# Patient Record
Sex: MALE | Race: WHITE | NOT HISPANIC OR LATINO | Employment: FULL TIME | URBAN - NONMETROPOLITAN AREA
[De-identification: names, ages, dates, MRNs, and addresses within clinical notes are randomized per-mention and may not be internally consistent; named-entity substitution may affect disease eponyms.]

---

## 2023-10-03 NOTE — PROGRESS NOTES
Lawrence Ramos is a 48 y.o. male who presents for No chief complaint on file.    Review of systems completed and unremarkable other than what is documented in HPI.     Social history:  Medical history:  Medications:  SurgHx:  Fhx:  Allergies:    Objective   There were no vitals taken for this visit.    Gen: No acute distress, alert and oriented x3, pleasant   HEENT: moist mucous membranes, b/l external auditory canals are clear of debris, TMs within normal limits, no oropharyngeal lesions, eomi, perrla   Neck: thyroid within normal limits, no lymphadenopathy   CV: RRR, normal S1/S2, no murmur   Resp: Clear to auscultation bilaterally, no wheezes or rhonchi appreciated  Abd: soft, nontender, non-distended, no guarding/rigidity, bowel sounds present  Extr: no edema, no calf tenderness  Derm: Skin is warm and dry, no rashes appreciated  Psych: mood is good, affect is congruent, good hygiene, normal speech and eye contact  Neuro: cranial nerves grossly intact, normal gait    Assessment/Plan

## 2023-10-10 ENCOUNTER — APPOINTMENT (OUTPATIENT)
Dept: PRIMARY CARE | Facility: CLINIC | Age: 48
End: 2023-10-10
Payer: COMMERCIAL

## 2023-10-13 ENCOUNTER — OFFICE VISIT (OUTPATIENT)
Dept: PRIMARY CARE | Facility: CLINIC | Age: 48
End: 2023-10-13
Payer: COMMERCIAL

## 2023-10-13 VITALS
HEART RATE: 71 BPM | HEIGHT: 73 IN | DIASTOLIC BLOOD PRESSURE: 85 MMHG | BODY MASS INDEX: 33.93 KG/M2 | WEIGHT: 256 LBS | SYSTOLIC BLOOD PRESSURE: 120 MMHG

## 2023-10-13 DIAGNOSIS — Z12.5 SCREENING FOR PROSTATE CANCER: ICD-10-CM

## 2023-10-13 DIAGNOSIS — Z12.11 SCREENING FOR COLON CANCER: ICD-10-CM

## 2023-10-13 DIAGNOSIS — Z00.00 HEALTHCARE MAINTENANCE: ICD-10-CM

## 2023-10-13 DIAGNOSIS — Z13.220 SCREENING FOR HYPERLIPIDEMIA: Primary | ICD-10-CM

## 2023-10-13 DIAGNOSIS — M54.50 LOW BACK PAIN, UNSPECIFIED BACK PAIN LATERALITY, UNSPECIFIED CHRONICITY, UNSPECIFIED WHETHER SCIATICA PRESENT: ICD-10-CM

## 2023-10-13 PROCEDURE — 99386 PREV VISIT NEW AGE 40-64: CPT | Performed by: FAMILY MEDICINE

## 2023-10-13 NOTE — PROGRESS NOTES
This is a 47yo male here to establish care:    Low back stiffness: He was running half marathons about 6 or 7 years ago. Now dealing with a lot of tightness in low back and legs. He has been to chiropractor with minimal improvement.     His wife is Jacklyn Ramos and his parents are Familia and Berenice Ramos. Here to establish care and checked out, prefers to avoid medications if possible.     Review of systems completed and unremarkable other than what is documented in HPI.    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  Allergies: PCN    Gen: No acute distress. Alert and oriented x3.   HEENT: Normocephalic, atraumatic. PERRLA and EOMI, no conjunctival injection. B/L EAC are clear, TM's viewed are WNL. No rhinorrhea, no oropharyngeal lesions.  Neck: No lymphadenopathy, thyroid WNL.  CV: Regular rate and rhythm. Normal S1/S2.  Resp: CTAB/L. No wheezes or rhonchi appreciated.  Abdomen: Soft. Nontender. Nondistended. Bowel sounds normoactive. No guarding or rigidity.  Derm: Skin is warm and dry. No rashes appreciated or suspicious lesions noted.   Neuro: Cranial nerves intact. Normal gait.  Psych: Appropriate mood and affect. Normal speech and eye contact.   Extremities: No deformities appreciated. No severe edema.     47yo male here to establish care:    #Low back pain  Referring to PT to work on core strengthening and stretching    HCM:  Referral placed for C-scope  Declining flu vaccine  UTD for COVID vaccine  Check labs  Declining PSA this year

## 2023-10-13 NOTE — PATIENT INSTRUCTIONS
PT/OT:  [facility] [phone]     Gastroenterology:  Samy Cornejo ([facility]) [phone]  Samy Barr ([facility]) [phone]

## 2024-10-18 ENCOUNTER — APPOINTMENT (OUTPATIENT)
Dept: PRIMARY CARE | Facility: CLINIC | Age: 49
End: 2024-10-18
Payer: COMMERCIAL

## 2024-11-05 NOTE — PROGRESS NOTES
"Subjective   Patient ID: Lawrence Ramos is a 49 y.o. male who presents for Follow-up (Dx with trigeminal neuralgia; finished low back PT, not having an issue right now, but it depends on the day, better than before).    Trigeminal neuralgia: Started back in April. Unsure what the cause is. He actually got referred to neurology by the dentist. First had dental procedure and they referred him to [facility]. Completed MRI/MRA. They talked about medications and he felt frustration about taking meds. He feels it is actually also TMJ arthritis. He went to a chiropractor in [place] and he agreed with the TMJ, They takjed to him about gabapentin and he decided against that. Sx fluctuate by the day. Almost gone over the summer. He can feel it in his face and lip. Right upper lipand teeth in the back, on the inside of his jaw. Sometimes feels it in the eye. Eye irritation with mowing the lawn and dust exposure. Sx last 30 seconds or several minutes. Things improved when the weather changed into beginning of summer, and that's also when he was seeing the [place] chiropractor. He is using medicinal marijuana with a small amount of THC which would alleviate his symptoms within 30 seconds. Currently taking it before bed.     His wife is Jacklyn Raoms and his parents are Familia and Berenice Ramos. 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      Review of systems completed and unremarkable other than what is documented in HPI.    Objective   /71 (BP Location: Left arm, Patient Position: Sitting, BP Cuff Size: Large adult)   Pulse 71   Ht 1.88 m (6' 2\")   Wt 122 kg (270 lb)   BMI 34.67 kg/m²     Gen: No acute distress, alert and oriented x3, pleasant   HEENT: moist mucous membranes, b/l external auditory canals are clear of debris, TMs within normal limits, no oropharyngeal lesions, eomi, perrla   Neck: thyroid within normal " limits, no lymphadenopathy   CV: RRR, normal S1/S2, no murmur   Resp: Clear to auscultation bilaterally, no wheezes or rhonchi appreciated  Abd: soft, nontender, non-distended, no guarding/rigidity, bowel sounds present  Extr: no edema, no calf tenderness  Derm: Skin is warm and dry, no rashes appreciated  Psych: mood is good, affect is congruent, good hygiene, normal speech and eye contact  Neuro: cranial nerves grossly intact, normal gait    Assessment/Plan   #TMJ  #TN  He prefers to avoid meds  He has seen chiropractor and is planning to see a TMJ specialist  Check labs, TMJ MRI     HCM:  Referral placed for C-scope  Declining flu vaccine  UTD for COVID vaccine  Check labs  Declining PSA this year

## 2024-11-12 ENCOUNTER — APPOINTMENT (OUTPATIENT)
Dept: PRIMARY CARE | Facility: CLINIC | Age: 49
End: 2024-11-12
Payer: COMMERCIAL

## 2024-11-12 VITALS
HEART RATE: 71 BPM | HEIGHT: 74 IN | WEIGHT: 270 LBS | BODY MASS INDEX: 34.65 KG/M2 | SYSTOLIC BLOOD PRESSURE: 117 MMHG | DIASTOLIC BLOOD PRESSURE: 71 MMHG

## 2024-11-12 DIAGNOSIS — Z12.5 SCREENING FOR PROSTATE CANCER: ICD-10-CM

## 2024-11-12 DIAGNOSIS — Z13.220 SCREENING FOR HYPERLIPIDEMIA: ICD-10-CM

## 2024-11-12 DIAGNOSIS — Z00.00 HEALTHCARE MAINTENANCE: Primary | ICD-10-CM

## 2024-11-12 DIAGNOSIS — M26.641 ARTHRITIS OF RIGHT TEMPOROMANDIBULAR JOINT: ICD-10-CM

## 2024-11-12 DIAGNOSIS — Z12.11 SCREENING FOR COLON CANCER: ICD-10-CM

## 2024-11-12 PROCEDURE — 1036F TOBACCO NON-USER: CPT | Performed by: FAMILY MEDICINE

## 2024-11-12 PROCEDURE — 3008F BODY MASS INDEX DOCD: CPT | Performed by: FAMILY MEDICINE

## 2024-11-12 PROCEDURE — 99396 PREV VISIT EST AGE 40-64: CPT | Performed by: FAMILY MEDICINE

## 2024-11-12 NOTE — PATIENT INSTRUCTIONS
Surgery:  Rolando Teague () [phone]  Elizabeth Hinton () General Surgery [phone]  Dilma Hays () [phone] (Carissa Banegas)

## 2025-11-12 ENCOUNTER — APPOINTMENT (OUTPATIENT)
Dept: PRIMARY CARE | Facility: CLINIC | Age: 50
End: 2025-11-12
Payer: COMMERCIAL